# Patient Record
Sex: MALE
[De-identification: names, ages, dates, MRNs, and addresses within clinical notes are randomized per-mention and may not be internally consistent; named-entity substitution may affect disease eponyms.]

---

## 2022-12-02 PROBLEM — Z00.00 ENCOUNTER FOR PREVENTIVE HEALTH EXAMINATION: Status: ACTIVE | Noted: 2022-12-02

## 2022-12-09 ENCOUNTER — APPOINTMENT (OUTPATIENT)
Dept: SURGERY | Facility: CLINIC | Age: 57
End: 2022-12-09

## 2022-12-09 VITALS
DIASTOLIC BLOOD PRESSURE: 82 MMHG | HEART RATE: 73 BPM | OXYGEN SATURATION: 98 % | WEIGHT: 193 LBS | HEIGHT: 72 IN | BODY MASS INDEX: 26.14 KG/M2 | SYSTOLIC BLOOD PRESSURE: 134 MMHG | TEMPERATURE: 97.9 F

## 2022-12-09 DIAGNOSIS — Z98.890 SCAR CONDITIONS AND FIBROSIS OF SKIN: ICD-10-CM

## 2022-12-09 DIAGNOSIS — Z78.9 OTHER SPECIFIED HEALTH STATUS: ICD-10-CM

## 2022-12-09 DIAGNOSIS — L90.5 SCAR CONDITIONS AND FIBROSIS OF SKIN: ICD-10-CM

## 2022-12-09 DIAGNOSIS — R22.2 LOCALIZED SWELLING, MASS AND LUMP, TRUNK: ICD-10-CM

## 2022-12-09 DIAGNOSIS — I10 ESSENTIAL (PRIMARY) HYPERTENSION: ICD-10-CM

## 2022-12-09 DIAGNOSIS — Z87.09 PERSONAL HISTORY OF OTHER DISEASES OF THE RESPIRATORY SYSTEM: ICD-10-CM

## 2022-12-09 PROCEDURE — 99203 OFFICE O/P NEW LOW 30 MIN: CPT

## 2022-12-09 RX ORDER — ATENOLOL 50 MG/1
TABLET ORAL
Refills: 0 | Status: ACTIVE | COMMUNITY

## 2022-12-09 RX ORDER — LOSARTAN POTASSIUM 100 MG/1
TABLET, FILM COATED ORAL
Refills: 0 | Status: ACTIVE | COMMUNITY

## 2022-12-09 RX ORDER — ROSUVASTATIN CALCIUM 10 MG/1
10 TABLET, FILM COATED ORAL
Refills: 0 | Status: ACTIVE | COMMUNITY

## 2022-12-09 NOTE — PHYSICAL EXAM
[Oriented to Person] : oriented to person [Oriented to Place] : oriented to place [Oriented to Time] : oriented to time [Calm] : calm [Abdominal Masses] : No abdominal masses [Abdomen Tenderness] : ~T ~M No abdominal tenderness [Tender] : was nontender [Enlarged] : not enlarged [de-identified] : NAD, comfortable [de-identified] : Normocephalic, atraumatic. No scleral icterus.  [de-identified] : Supple, no JVD or cervical lymphadenopathy.  [de-identified] : No respiratory distress  [de-identified] : +BS soft, nontender, nondistended. Well healed midline sternotomy scar. Well healed umbilical incision. Do no appreciate umbilical hernia recurrence. There is a supraumbilical/midline abdominal mass, likely a diastasis.  Nontender.

## 2022-12-09 NOTE — HISTORY OF PRESENT ILLNESS
[de-identified] : This is a 58 y/o male presenting to the office for evaluation and management of a midline abdominal bulge. He states he first noticed the bulge x 5 years ago while exercising. Overtime the bulge has increased in size. He denies any significant or discomfort, however has stopped exercises due to the bulge. Hx of umbilical hernia repair in the past. Denies any urinary or obstructive issues. Generally healthy. Prior history of sternotomy. Runs a healthcare organization.

## 2022-12-09 NOTE — ASSESSMENT
[FreeTextEntry1] : Pleasant 56 y/o male with hx of several years of midline abdominal bulge, prior umbilical hernia repair with mesh. Discussed next steps for CTAP to further evaluate integrity of the abdominal wall and determine presence of diastasis vs hernia given history of prior surgery and prosethetic device in place Will call pt with results. All questions answered. Notably greater than 50% of today's visit was spent on counseling and coordination of patients care. \par \par Plan:\par -CTAP

## 2023-01-23 ENCOUNTER — APPOINTMENT (OUTPATIENT)
Dept: SURGERY | Facility: CLINIC | Age: 58
End: 2023-01-23
Payer: COMMERCIAL

## 2023-01-23 VITALS
WEIGHT: 195 LBS | HEIGHT: 72 IN | DIASTOLIC BLOOD PRESSURE: 82 MMHG | SYSTOLIC BLOOD PRESSURE: 134 MMHG | OXYGEN SATURATION: 97 % | BODY MASS INDEX: 26.41 KG/M2 | HEART RATE: 81 BPM | TEMPERATURE: 96.4 F

## 2023-01-23 DIAGNOSIS — M62.08 SEPARATION OF MUSCLE (NONTRAUMATIC), OTHER SITE: ICD-10-CM

## 2023-01-23 DIAGNOSIS — K21.9 GASTRO-ESOPHAGEAL REFLUX DISEASE W/OUT ESOPHAGITIS: ICD-10-CM

## 2023-01-23 DIAGNOSIS — K31.89 OTHER DISEASES OF STOMACH AND DUODENUM: ICD-10-CM

## 2023-01-23 PROCEDURE — 99213 OFFICE O/P EST LOW 20 MIN: CPT

## 2023-02-10 ENCOUNTER — TRANSCRIPTION ENCOUNTER (OUTPATIENT)
Age: 58
End: 2023-02-10

## 2023-03-21 ENCOUNTER — APPOINTMENT (OUTPATIENT)
Dept: GASTROENTEROLOGY | Facility: CLINIC | Age: 58
End: 2023-03-21
Payer: COMMERCIAL

## 2023-03-21 VITALS
DIASTOLIC BLOOD PRESSURE: 76 MMHG | OXYGEN SATURATION: 98 % | SYSTOLIC BLOOD PRESSURE: 118 MMHG | TEMPERATURE: 98.1 F | BODY MASS INDEX: 26.41 KG/M2 | RESPIRATION RATE: 16 BRPM | HEIGHT: 72 IN | WEIGHT: 195 LBS | HEART RATE: 86 BPM

## 2023-03-21 DIAGNOSIS — R93.3 ABNORMAL FINDINGS ON DIAGNOSTIC IMAGING OF OTHER PARTS OF DIGESTIVE TRACT: ICD-10-CM

## 2023-03-21 PROCEDURE — 99204 OFFICE O/P NEW MOD 45 MIN: CPT

## 2023-03-21 NOTE — PHYSICAL EXAM
[Alert] : alert [Normal Voice/Communication] : normal voice/communication [Healthy Appearing] : healthy appearing [No Acute Distress] : no acute distress [Sclera] : the sclera and conjunctiva were normal [Hearing Threshold Finger Rub Not [place]] : hearing was normal [Normal Lips/Gums] : the lips and gums were normal [Oropharynx] : the oropharynx was normal [Normal Appearance] : the appearance of the neck was normal [No Neck Mass] : no neck mass was observed [No Respiratory Distress] : no respiratory distress [No Acc Muscle Use] : no accessory muscle use [Respiration, Rhythm And Depth] : normal respiratory rhythm and effort [Auscultation Breath Sounds / Voice Sounds] : lungs were clear to auscultation bilaterally [Bowel Sounds] : normal bowel sounds [Abdomen Tenderness] : non-tender [No Masses] : no abdominal mass palpated [Abdomen Soft] : soft [Cervical Lymph Nodes Enlarged Posterior Bilaterally] : no posterior cervical lymphadenopathy [Supraclavicular Lymph Nodes Enlarged Bilaterally] : no supraclavicular lymphadenopathy [Axillary Lymph Nodes Enlarged Bilaterally] : no axillary lymphadenopathy [No CVA Tenderness] : no CVA  tenderness [No Spinal Tenderness] : no spinal tenderness [Abnormal Walk] : normal gait [No Clubbing, Cyanosis] : no clubbing or cyanosis of the fingernails [No Joint Swelling] : no joint swelling seen [Normal Color / Pigmentation] : normal skin color and pigmentation [] : no rash [Normal Turgor] : normal skin turgor [Skin Lesions] : no skin lesions [Cranial Nerves Intact] : cranial nerves 2-12 were intact [Sensation] : the sensory exam was normal to light touch and pinprick [Motor Exam] : the motor exam was normal [Oriented To Time, Place, And Person] : oriented to person, place, and time

## 2023-03-21 NOTE — PHYSICAL EXAM
[Alert] : alert [Normal Voice/Communication] : normal voice/communication [Healthy Appearing] : healthy appearing [No Acute Distress] : no acute distress [Sclera] : the sclera and conjunctiva were normal [Hearing Threshold Finger Rub Not [place]] : hearing was normal [Normal Lips/Gums] : the lips and gums were normal [Oropharynx] : the oropharynx was normal [Normal Appearance] : the appearance of the neck was normal [No Neck Mass] : no neck mass was observed [No Respiratory Distress] : no respiratory distress [No Acc Muscle Use] : no accessory muscle use [Respiration, Rhythm And Depth] : normal respiratory rhythm and effort [Auscultation Breath Sounds / Voice Sounds] : lungs were clear to auscultation bilaterally [Bowel Sounds] : normal bowel sounds [Abdomen Tenderness] : non-tender [No Masses] : no abdominal mass palpated [Abdomen Soft] : soft [Cervical Lymph Nodes Enlarged Posterior Bilaterally] : no posterior cervical lymphadenopathy [Supraclavicular Lymph Nodes Enlarged Bilaterally] : no supraclavicular lymphadenopathy [Axillary Lymph Nodes Enlarged Bilaterally] : no axillary lymphadenopathy [No CVA Tenderness] : no CVA  tenderness [No Spinal Tenderness] : no spinal tenderness [Abnormal Walk] : normal gait [No Clubbing, Cyanosis] : no clubbing or cyanosis of the fingernails [No Joint Swelling] : no joint swelling seen [Normal Color / Pigmentation] : normal skin color and pigmentation [] : no rash [Normal Turgor] : normal skin turgor [Skin Lesions] : no skin lesions [Cranial Nerves Intact] : cranial nerves 2-12 were intact [Sensation] : the sensory exam was normal to light touch and pinprick [Motor Exam] : the motor exam was normal [Oriented To Time, Place, And Person] : oriented to person, place, and time

## 2023-03-22 NOTE — ASSESSMENT
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, PMHx reflux, hiatal hernia, AVR (6 yrs ago), HTN presenting as a referral from Dr Saravia for consideration of EGD for pancreatic head bulkiness and gastric wall thickening.\par \par #Abnormal finding on GI tract imaging\par #Reflux \par -Counseled on dietary/lifestyle modifications to mitigate reflux sxs\par -Plan for EGD/EUS at [facility], R/B/A/L discussed, all questions answered\par -Obtain imaging study from [facility] to upload/formally review images with radiology\par -Obtain previous EGD/Colonoscopy reports from Dr Mario Taylor's (GI) office for review\par \par Dorothy Trivedi DO\par Gastroenterology Fellow\par

## 2023-03-22 NOTE — HISTORY OF PRESENT ILLNESS
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 \par \par Notes he has a long-standing history of daily GERD sxs, self treats with Tums/H2RA. He is aware of the triggers.\par \par Previous EGD with some inflammation, ? duodenum\par \par Referred by - Dr Vinicio Saravia\par \par PMHx - reflux, hiatal hernia, AVR (6 yrs ago)\par PSHx - AVR (6 yrs ago), umbilical hernia repair (12 yrs ago)\par Rx - Losartan 25 mg daily, Atenolol 12.5 mg daily, Crestor 10 mg daily\par Supplements/herbs/OTC - no supplements\par A/C or NSAIDs? - PRN (no ASA)\par FMHx - no CRC or GI related malignancy, no IBD\par Allergies - seasonal allergies, contact dermatitis from latex\par EtOH - few drinks/week\par Smoking - cigars occasionally (2x/yr)\par Drugs - no prior\par Diet - non-restrictive\par \par Labs/Stool Tests - no recent BW\par \par Imaging - \par \par CT A/P (12/19/22) - pancreatic head bulky, diffuse wall thickening of gastric fundus (possible underdistention), scattered colonic diverticulosis. \par \par MRI Abd (1/19/23) - unremarkable pancreas; cholelithiasis, hepatic and splenic cysts, small sliding HH\par \par EGD - approx 1 yr, Dr Dr Mario Taylor (close to group home)\par \par Colonoscopy - every 5 yrs or so, last one 2-3 yrs ago\par \par

## 2023-03-22 NOTE — HISTORY OF PRESENT ILLNESS
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 \par \par Notes he has a long-standing history of daily GERD sxs, self treats with Tums/H2RA. He is aware of the triggers.\par \par Previous EGD with some inflammation, ? duodenum\par \par Referred by - Dr Vinicio Saravia\par \par PMHx - reflux, hiatal hernia, AVR (6 yrs ago)\par PSHx - AVR (6 yrs ago), umbilical hernia repair (12 yrs ago)\par Rx - Losartan 25 mg daily, Atenolol 12.5 mg daily, Crestor 10 mg daily\par Supplements/herbs/OTC - no supplements\par A/C or NSAIDs? - PRN (no ASA)\par FMHx - no CRC or GI related malignancy, no IBD\par Allergies - seasonal allergies, contact dermatitis from latex\par EtOH - few drinks/week\par Smoking - cigars occasionally (2x/yr)\par Drugs - no prior\par Diet - non-restrictive\par \par Labs/Stool Tests - no recent BW\par \par Imaging - \par \par CT A/P (12/19/22) - pancreatic head bulky, diffuse wall thickening of gastric fundus (possible underdistention), scattered colonic diverticulosis. \par \par MRI Abd (1/19/23) - unremarkable pancreas; cholelithiasis, hepatic and splenic cysts, small sliding HH\par \par EGD - approx 1 yr, Dr Dr Mario Taylor (close to [facility])\par \par Colonoscopy - every 5 yrs or so, last one 2-3 yrs ago\par \par

## 2023-03-22 NOTE — ASSESSMENT
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, PMHx reflux, hiatal hernia, AVR (6 yrs ago), HTN presenting as a referral from Dr Saravia for consideration of EGD for pancreatic head bulkiness and gastric wall thickening.\par \par #Abnormal finding on GI tract imaging\par #Reflux \par -Counseled on dietary/lifestyle modifications to mitigate reflux sxs\par -Plan for EGD/EUS at [facility], R/B/A/L discussed, all questions answered\par -Obtain imaging study from [facility] to upload/formally review images with radiology\par -Obtain previous EGD/Colonoscopy reports from Dr Mario Taylor's (GI) office for review\par \par Dorothy Trivedi DO\par Gastroenterology Fellow\par

## 2023-03-23 ENCOUNTER — APPOINTMENT (OUTPATIENT)
Age: 58
End: 2023-03-23
Payer: COMMERCIAL

## 2023-03-23 PROCEDURE — 43237 ENDOSCOPIC US EXAM ESOPH: CPT

## 2023-03-23 PROCEDURE — 43239 EGD BIOPSY SINGLE/MULTIPLE: CPT

## 2023-03-23 RX ORDER — ESOMEPRAZOLE MAGNESIUM 40 MG/1
40 CAPSULE, DELAYED RELEASE ORAL DAILY
Qty: 60 | Refills: 2 | Status: ACTIVE | COMMUNITY
Start: 2023-03-23 | End: 1900-01-01

## 2023-04-13 PROBLEM — M62.08 DIASTASIS RECTI: Status: ACTIVE | Noted: 2022-12-09

## 2023-04-13 NOTE — PHYSICAL EXAM
[Oriented to Person] : oriented to person [Oriented to Place] : oriented to place [Oriented to Time] : oriented to time [Calm] : calm [Abdominal Masses] : No abdominal masses [Abdomen Tenderness] : ~T ~M No abdominal tenderness [Tender] : was nontender [Enlarged] : not enlarged [de-identified] : NAD, comfortable [de-identified] : Normocephalic, atraumatic. No scleral icterus.  [de-identified] : Supple, no JVD or cervical lymphadenopathy.  [de-identified] : No respiratory distress  [de-identified] : +BS soft, nontender, nondistended. Well healed midline sternotomy scar. Well healed umbilical incision. Do no appreciate umbilical hernia recurrence. There is a rectus diastasis.

## 2023-04-13 NOTE — HISTORY OF PRESENT ILLNESS
[de-identified] : This is a 58 y/o male presenting to the office for evaluation and management of a midline abdominal bulge. He states he first noticed the bulge x 5 years ago while exercising. Overtime the bulge has increased in size. He denies any significant or discomfort, however has stopped exercises due to the bulge. Hx of umbilical hernia repair in the past. Denies any urinary or obstructive issues. Generally healthy. Prior history of sternotomy. Runs a healthcare organization.  [de-identified] : 1-: Returns to office. Overall doing well. Denies any signficant pain or discomfort. Denies any change in size of bulge. Underwent CT of abd/pelvis and MRI abdomen. Here today for review.

## 2023-04-13 NOTE — ASSESSMENT
[FreeTextEntry1] : Case discussed/pt seen with attending, . Pleasant 58 y/o male with rectus diastasis. Hx of significant reflux. CT abdomen/pelvis revealed thickened stomach vs underdistention. Discussed referral to gastroenterology for further workup/endoscopy. Referral provided. Referral provided to plastic surgery for further eval/management of rectus diastasis. All questions answered. RTC prn. \par \par Plan:\par -GI referral \par -Plastics referral \par -RTC prn

## 2023-04-13 NOTE — DATA REVIEWED
[FreeTextEntry1] : Date of Exam: 12-\par  \par EXAM:  CT ABDOMEN AND PELVIS WITHOUT CONTRAST\par \par Note - This patient has received 0 CT studies and 0 Myocardial Perfusion studies within our network over the previous 12 month period.\par HISTORY:  Localized swelling, fibrosis of skin \par TECHNIQUE: CT of the abdomen and pelvis is performed.\par Contrast Technique: Without. Please note that lack of intravenous contrast limits evaluation of solid abdominal viscera.\par Oral Contrast: Yes\par Range/Planes: Domes of the diaphragm to the pubic symphysis. Axial, coronal, and sagittal. \par One or more of the following dose reduction techniques were used: automated exposure control, adjustment of the mA and/or kV according to patient size, use of iterative reconstruction technique. \par COMPARISON:  None\par FINDINGS: VISUALIZED PORTION OF CHEST\par LUNGS: Unremarkable.\par HEART: Heart is normal in size. Status post aortic valve replacement.\par \par FINDINGS: ABDOMEN/PELVIS\par \par LIVER: The liver is normal in size and morphology. No evidence for fatty liver. There is too small to characterize hypodensity in the left hepatic lobe.\par \par BILIARY: Unremarkable.\par \par PANCREAS: Pancreatic head appears bulky, evaluation is limited due to lack of IV contrast. No discrete pancreatic ductal dilatation.\par \par SPLEEN: Unremarkable.\par \par ADRENAL GLANDS: Unremarkable.\par \par KIDNEYS: Unremarkable.\par \par URETERS: Unremarkable.\par \par URINARY BLADDER: Limited evaluation due to under distention.\par \par REPRODUCTIVE ORGANS: Prostate is enlarged measuring 5 cm in transverse dimension.\par \par STOMACH: Small hiatal hernia. There is diffuse wall thickening of the gastric fundus, this might be related to underdistention.\par \par SMALL BOWEL: Unremarkable.\par \par LARGE BOWEL: Scattered colonic diverticulosis. No bowel obstruction, wall thickening or surrounding inflammatory changes. The appendix is normal.\par \par PERITONEUM: Unremarkable.\par \par LYMPH NODES: Unremarkable.\par \par VASCULATURE: Calcified splenic artery aneurysm measuring approximately 1.5 x 1.2 cm.\par \par SOFT TISSUES: Unremarkable.\par \par BONES: Grade 1 anterolisthesis of L5 over S1.\par \par IMPRESSION:  \par Pancreatic head appears bulky, evaluation is limited due to lack of IV contrast. Underlying mass cannot be excluded. Further evaluation with MRI abdomen with and without contrast is recommended.\par \par There is diffuse wall thickening of the gastric fundus, this might be related to underdistention. Direct visualization/endoscopy can be obtained if clinically warranted.\par \par Scattered colonic diverticulosis without evidence of diverticulitis\par \par \par \par Date of Exam: 12-\par  \par EXAM:  MRI ABDOMEN WITHOUT AND WITH CONTRAST\par HISTORY:  Bulky pancreatic head\par TECHNIQUE:  MRI of the abdomen performed.\par IV Contrast:  19 ml of Clariscan was injected from a 20 ml single use vial.\par Oral Contrast: None.\par Acquisition:  A multiplanar multiecho liver MRI examination was performed utilizing a 3T magnet. \par Subtraction views are created. Diffusion weighted imaging with multiple B values and ADC map imaging is also provided. MRCP imaging is obtained with both thick slab and thin slab acquisition technique.\par \par COMPARISON:  CT 12/19/2022\par \par FINDINGS: VISUALIZED PORTION OF CHEST\par LUNGS: No abnormal signal.\par HEART: No abnormal signal.\par \par FINDINGS: ABDOMEN\par \par LIVER: The liver appears normal in size. Borderline hepatic steatosis with a fat fraction of 4.5%. No evidence of iron overload. There are a few subcentimeter hepatic cysts. No suspicious hepatic lesion.\par \par BILIARY: Couple tiny filling defects within the gallbladder lumen likely small stones. Tiny gallbladder polyps would be another consideration. The diameter of the intrahepatic and extrahepatic bile ducts is normal.\par \par PANCREAS: The pancreatic duct diameter is normal. Minimal fullness of the pancreatic head is likely a normal variant. There are no solid hepatic lesions. No evidence of pancreatic/peripancreatic inflammation.\par \par SPLEEN: The spleen appears normal in size. Multiple subcentimeter T2 hyperintense T1 hypointense splenic lesions are too small to characterize, without definitive enhancement, possibly small cysts.\par \par ADRENAL GLANDS: The adrenal glands appear unremarkable.\par \par KIDNEYS: The kidneys appear normal in size. No evidence of hydronephrosis. No suspicious splenic lesion.\par \par PROXIMAL URETERS: No evidence of hydroureter.\par \par STOMACH: Small sliding hiatal hernia. The stomach is incompletely distended.\par \par VISUALIZED PORTION OF BOWEL: No evidence of bowel distention.\par \par PERITONEUM: No evidence of ascites.\par \par LYMPH NODES: Multiple nonenlarged abdominal lymph nodes.\par \par VASCULATURE: The diameter of the abdominal aorta is normal. Patent portal vein.\par \par SOFT TISSUES: Unremarkable.\par \par BONES: No suspicious osseous abnormality.\par \par IMPRESSION:\par Unremarkable appearance of the pancreas. No evidence of a pancreatic mass.\par Cholelithiasis.\par Hepatic and splenic cysts.\par Small sliding hiatal hernia.